# Patient Record
Sex: FEMALE | Race: WHITE | NOT HISPANIC OR LATINO
[De-identification: names, ages, dates, MRNs, and addresses within clinical notes are randomized per-mention and may not be internally consistent; named-entity substitution may affect disease eponyms.]

---
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
## 2025-03-17 ENCOUNTER — NON-APPOINTMENT (OUTPATIENT)
Age: 20
End: 2025-03-17

## 2025-04-07 NOTE — ED PEDIATRIC NURSE NOTE - CINV DISCH TEACH PARTICIP
Passes (Optional): 9 Post-Care Instructions: I reviewed with the patient in detail post-care instructions. Pain Level (Optional): 0 (little to none) Detail Level: [employer] In [place] (Optional): 20 External Cooling Fan Speed: 5 Treatment Number (Optional): 1 Eye Shielding Text (Leave Blank If Unwanted- Will Be Inserted If Selecting Eye Shields): The intraocular eye shields were placed. 2 drops of intraocular tetracaine HCL ophthalmic 0.5% solution was administered. The eye shields were coated with ophthalmic bacitracin prior to insertion. After the shields were removed the eyes were flushed with normal saline. Consent: Written consent obtained, risks reviewed including but not limited to crusting, scabbing, blistering, scarring, darker or lighter pigmentary change, and/or incomplete removal. External Cooling: [employer] 5 Actual Kj Used (Optional): 1.01 Parent(s)